# Patient Record
Sex: MALE | Race: WHITE | NOT HISPANIC OR LATINO | Employment: FULL TIME | URBAN - METROPOLITAN AREA
[De-identification: names, ages, dates, MRNs, and addresses within clinical notes are randomized per-mention and may not be internally consistent; named-entity substitution may affect disease eponyms.]

---

## 2022-12-09 ENCOUNTER — HOSPITAL ENCOUNTER (OUTPATIENT)
Dept: RADIOLOGY | Facility: HOSPITAL | Age: 37
Discharge: HOME | End: 2022-12-09
Attending: FAMILY MEDICINE
Payer: COMMERCIAL

## 2022-12-09 ENCOUNTER — OFFICE VISIT (OUTPATIENT)
Dept: PRIMARY CARE CLINIC | Facility: CLINIC | Age: 37
End: 2022-12-09
Payer: COMMERCIAL

## 2022-12-09 VITALS
WEIGHT: 211.63 LBS | RESPIRATION RATE: 16 BRPM | BODY MASS INDEX: 28.66 KG/M2 | HEART RATE: 96 BPM | SYSTOLIC BLOOD PRESSURE: 118 MMHG | HEIGHT: 72 IN | TEMPERATURE: 98 F | DIASTOLIC BLOOD PRESSURE: 68 MMHG

## 2022-12-09 DIAGNOSIS — R07.89 CHEST DISCOMFORT: ICD-10-CM

## 2022-12-09 DIAGNOSIS — R10.84 GENERALIZED ABDOMINAL PAIN: ICD-10-CM

## 2022-12-09 DIAGNOSIS — Z13.6 ENCOUNTER FOR LIPID SCREENING FOR CARDIOVASCULAR DISEASE: ICD-10-CM

## 2022-12-09 DIAGNOSIS — R53.83 FATIGUE, UNSPECIFIED TYPE: ICD-10-CM

## 2022-12-09 DIAGNOSIS — R06.02 SOB (SHORTNESS OF BREATH) ON EXERTION: ICD-10-CM

## 2022-12-09 DIAGNOSIS — Z13.1 DIABETES MELLITUS SCREENING: ICD-10-CM

## 2022-12-09 DIAGNOSIS — R11.0 NAUSEA: ICD-10-CM

## 2022-12-09 DIAGNOSIS — Z00.00 ANNUAL PHYSICAL EXAM: Primary | ICD-10-CM

## 2022-12-09 DIAGNOSIS — G89.29 CHRONIC BILATERAL LOW BACK PAIN WITH LEFT-SIDED SCIATICA: ICD-10-CM

## 2022-12-09 DIAGNOSIS — Z12.11 COLON CANCER SCREENING: ICD-10-CM

## 2022-12-09 DIAGNOSIS — Z11.59 ENCOUNTER FOR HCV SCREENING TEST FOR LOW RISK PATIENT: ICD-10-CM

## 2022-12-09 DIAGNOSIS — Z13.29 THYROID DISORDER SCREENING: ICD-10-CM

## 2022-12-09 DIAGNOSIS — Z76.89 ENCOUNTER TO ESTABLISH CARE: ICD-10-CM

## 2022-12-09 DIAGNOSIS — M54.42 CHRONIC BILATERAL LOW BACK PAIN WITH LEFT-SIDED SCIATICA: ICD-10-CM

## 2022-12-09 DIAGNOSIS — Z11.4 ENCOUNTER FOR SCREENING FOR HIV: ICD-10-CM

## 2022-12-09 DIAGNOSIS — Z13.220 ENCOUNTER FOR LIPID SCREENING FOR CARDIOVASCULAR DISEASE: ICD-10-CM

## 2022-12-09 PROCEDURE — 71046 X-RAY EXAM CHEST 2 VIEWS: CPT | Mod: TC,PN

## 2022-12-09 PROCEDURE — 99385 PREV VISIT NEW AGE 18-39: CPT | Mod: S$GLB,,, | Performed by: FAMILY MEDICINE

## 2022-12-09 PROCEDURE — 3008F PR BODY MASS INDEX (BMI) DOCUMENTED: ICD-10-PCS | Mod: CPTII,S$GLB,, | Performed by: FAMILY MEDICINE

## 2022-12-09 PROCEDURE — 3078F PR MOST RECENT DIASTOLIC BLOOD PRESSURE < 80 MM HG: ICD-10-PCS | Mod: CPTII,S$GLB,, | Performed by: FAMILY MEDICINE

## 2022-12-09 PROCEDURE — 93010 EKG 12-LEAD: ICD-10-PCS | Mod: S$GLB,,, | Performed by: INTERNAL MEDICINE

## 2022-12-09 PROCEDURE — 3008F BODY MASS INDEX DOCD: CPT | Mod: CPTII,S$GLB,, | Performed by: FAMILY MEDICINE

## 2022-12-09 PROCEDURE — 99999 PR PBB SHADOW E&M-EST. PATIENT-LVL V: CPT | Mod: PBBFAC,,, | Performed by: FAMILY MEDICINE

## 2022-12-09 PROCEDURE — 99385 PR PREVENTIVE VISIT,NEW,18-39: ICD-10-PCS | Mod: S$GLB,,, | Performed by: FAMILY MEDICINE

## 2022-12-09 PROCEDURE — 93005 ELECTROCARDIOGRAM TRACING: CPT | Mod: S$GLB,,, | Performed by: FAMILY MEDICINE

## 2022-12-09 PROCEDURE — 1159F PR MEDICATION LIST DOCUMENTED IN MEDICAL RECORD: ICD-10-PCS | Mod: CPTII,S$GLB,, | Performed by: FAMILY MEDICINE

## 2022-12-09 PROCEDURE — 1159F MED LIST DOCD IN RCRD: CPT | Mod: CPTII,S$GLB,, | Performed by: FAMILY MEDICINE

## 2022-12-09 PROCEDURE — 93010 ELECTROCARDIOGRAM REPORT: CPT | Mod: S$GLB,,, | Performed by: INTERNAL MEDICINE

## 2022-12-09 PROCEDURE — 1160F PR REVIEW ALL MEDS BY PRESCRIBER/CLIN PHARMACIST DOCUMENTED: ICD-10-PCS | Mod: CPTII,S$GLB,, | Performed by: FAMILY MEDICINE

## 2022-12-09 PROCEDURE — 3074F SYST BP LT 130 MM HG: CPT | Mod: CPTII,S$GLB,, | Performed by: FAMILY MEDICINE

## 2022-12-09 PROCEDURE — 93005 EKG 12-LEAD: ICD-10-PCS | Mod: S$GLB,,, | Performed by: FAMILY MEDICINE

## 2022-12-09 PROCEDURE — 3074F PR MOST RECENT SYSTOLIC BLOOD PRESSURE < 130 MM HG: ICD-10-PCS | Mod: CPTII,S$GLB,, | Performed by: FAMILY MEDICINE

## 2022-12-09 PROCEDURE — 99999 PR PBB SHADOW E&M-EST. PATIENT-LVL V: ICD-10-PCS | Mod: PBBFAC,,, | Performed by: FAMILY MEDICINE

## 2022-12-09 PROCEDURE — 3078F DIAST BP <80 MM HG: CPT | Mod: CPTII,S$GLB,, | Performed by: FAMILY MEDICINE

## 2022-12-09 PROCEDURE — 1160F RVW MEDS BY RX/DR IN RCRD: CPT | Mod: CPTII,S$GLB,, | Performed by: FAMILY MEDICINE

## 2022-12-09 RX ORDER — ONDANSETRON 4 MG/1
4 TABLET, ORALLY DISINTEGRATING ORAL EVERY 6 HOURS PRN
Qty: 20 TABLET | Refills: 3 | Status: ON HOLD | OUTPATIENT
Start: 2022-12-09 | End: 2023-01-18

## 2022-12-09 RX ORDER — PROMETHAZINE HYDROCHLORIDE 25 MG/1
25 SUPPOSITORY RECTAL EVERY 6 HOURS PRN
Qty: 12 SUPPOSITORY | Refills: 3 | Status: ON HOLD | OUTPATIENT
Start: 2022-12-09 | End: 2023-01-18

## 2022-12-09 RX ORDER — PANTOPRAZOLE SODIUM 20 MG/1
20 TABLET, DELAYED RELEASE ORAL DAILY
Qty: 90 TABLET | Refills: 3 | Status: SHIPPED | OUTPATIENT
Start: 2022-12-09 | End: 2023-12-09

## 2022-12-09 NOTE — PATIENT INSTRUCTIONS
Physically, everything looks good today.      Let us get some screening blood work today to check things on the inside.  Those results will be posted to [facility] as soon as they are available.      Let us also get an EKG and chest x-ray to check out your heart, lungs, etc..  We will contact you with those results when they are available.      Instead of taking Zantac, let us switch you over to Protonix (pantoprazole).  Take this once daily, each morning.  This should help settle your stomach and minimize the nausea.    Prescriptions sent for both Zofran and Phenergan suppositories to use, as needed, if the nausea returns.      With these symptoms, and your family history, let us also have you see Gastroenterology.  Would like them to check you out, and discuss doing an EGD and colonoscopy to check your GI tract on the inside.  Referral placed today.  We may need to go to [place] for this to get this done sooner.      Otherwise, continue to eat a healthy diet.  Be careful with portion sizes.  Includes lots of fresh fruits, vegetables, whole grains, lean proteins.  See info below.    Keep hydrated.  Be sure to drink at least 8-10, 8 oz, glasses of water every day.    Stay active.  Try to do some sort of physical activity every day.  Nothing outrageous, just try walking for 10-15 minutes each day.

## 2022-12-09 NOTE — PROGRESS NOTES
[facility]       [address] [place] [address]      Phone: [phone]      Fax: [phone]    Alfie Castillo MD    Office Visit  12/09/2022    Establish Care      37-year-old gentleman presents today to [facility], annual wellness exam, discuss multiple issues.      Patient states that he has been having some abdominal issues lately.  Several months ago, he got sick.  Developed fever, chills, sweats.  Had nausea, vomiting.  Symptoms lasted about seven days, then resolved on its own.  Saw urgent Care in another state, was given Zofran, another stomach medicine.  Same set of symptoms happened again on another occasion.      Recently, the week of Thanksgiving, he had a similar episode, but symptoms only lasted one day.  This past Friday, one week ago, he had symptoms again, also lasting only one day.  Today, he reports some mild nausea, extreme fatigue, had a headache this morning.  States that he has not had a good, regular, bowel movement in many months.  Bowel movements are always very soft or liquid.  Does have multiple bowel movements daily.  No urinary issues.    States that certain foods will cause him to have heartburn, especially if he eats right before bedtime.  Takes Zantac, as needed, for this.    Also reports some chest discomfort, upper back pains.  Feels like it is hard to take a deep breath.  Hurts to breathe.  These symptoms get worse with activity.    Also reports ongoing lower back pains.  Bilateral.  Radiates to left leg.  Skin of left leg, just above the knee, feels numb, tingly, almost burning sensation.    States that his mom was recently diagnosed with lung cancer.  This is a new diagnosis.  She has multiple nodules in the lung, brain, lymph nodes.  Poor prognosis.  Thinks some of this may have him feeling down, fatigued, drained.    PMH: None reported   PSH: Right ring finger  FH: Lung cancer.  Colon cancer (maternal uncle  passed from at age 36).  CVA.  CAD/mi/CABG.  DM.  DDD.    Allergies:  NKDA   Social: Works in [employer]    T: Quit three weeks ago.  Previously, one ppd times 15 years  A:  Denies   D:  Smokes THC most evenings     Exercise:  No regular exercise program, but very active at work      Immunizations:    There is no immunization history on file for this patient.    Care Teams:  Patient Care Team:  Alfie Castillo MD as PCP - General (Family Medicine)    Medical History:  History reviewed. No pertinent past medical history.    Social History:  Social History     Socioeconomic History    Marital status: Single   Tobacco Use    Smoking status: Former     Types: Vaping with nicotine, Cigarettes     Quit date: 2022     Years since quittin.0    Smokeless tobacco: Never    Tobacco comments:     Vaping now, quit cigarettes   Substance and Sexual Activity    Alcohol use: Not Currently    Drug use: Yes     Types: Marijuana    Sexual activity: Yes     Partners: Male       Alcohol History:  Social History     Substance and Sexual Activity   Alcohol Use Not Currently       Tobacco History:  Social History     Tobacco Use   Smoking Status Former    Types: Vaping with nicotine, Cigarettes    Quit date: 2022    Years since quittin.0   Smokeless Tobacco Never   Tobacco Comments    Vaping now, quit cigarettes       Family History:  Family History   Problem Relation Age of Onset    Lung cancer Mother     Heart disease Father     Diabetes Father     Heart disease Maternal Grandmother     Stroke Maternal Grandmother     Stroke Paternal Grandmother     Heart disease Paternal Grandmother     Diabetes Paternal Grandfather        Surgical History:  Past Surgical History:   Procedure Laterality Date    FINGER SURGERY Right 2022       Allergies:  Review of patient's allergies indicates:  No Known Allergies    Medications:    Current Outpatient Medications:     ranitidine HCl (ZANTAC ORAL), Take by mouth., Disp: ,  "Rfl:     ondansetron (ZOFRAN-ODT) 4 MG TbDL, Take 1 tablet (4 mg total) by mouth every 6 (six) hours as needed (nausea)., Disp: 20 tablet, Rfl: 3    pantoprazole (PROTONIX) 20 MG tablet, Take 1 tablet (20 mg total) by mouth once daily., Disp: 90 tablet, Rfl: 3    promethazine (PHENERGAN) 25 MG suppository, Place 1 suppository (25 mg total) rectally every 6 (six) hours as needed for Nausea., Disp: 12 suppository, Rfl: 3    Health Maintenance:  Health Maintenance   Topic Date Due    Hepatitis C Screening  Never done    Lipid Panel  Never done    TETANUS VACCINE  Never done       Screening Questions  1.  Do you smoke?  Just recently quit  2.  In the past month have you been bothered by feeling "down", depressed or hopeless? No  3.  In the past month, have you experienced a loss of interest or pleasure in doing things? No  4.  In the past 3 months, on any single occasion have you had 5 or more drinks containing alcohol? No  5.  Regarding your use of alcohol, have you ever felt you should cut down on your drinking? No  6.  Are you sexually active? Yes  7   Do you exercise?   Active at work    Counseling  The patient was counseled regarding diet and exercise, motor vehicle safety, sun exposure, and use of vitamins and supplements.  The patient was counseled regarding Living Will/Durable Power-Of-.  The patient was given information regarding the dangers of smoking and the overuse of alcohol.      Review of Systems   Constitutional:  Positive for chills. Negative for activity change, appetite change and fever.   HENT:  Negative for congestion, postnasal drip, rhinorrhea, sore throat and trouble swallowing.    Respiratory:  Positive for shortness of breath. Negative for cough.    Cardiovascular:  Positive for chest pain. Negative for palpitations.   Gastrointestinal:  Positive for abdominal pain and nausea. Negative for constipation, diarrhea and vomiting.   Genitourinary:  Negative for difficulty urinating. "   Musculoskeletal:  Negative for arthralgias and myalgias.   Skin:  Negative for color change and rash.   Neurological:  Negative for headaches.   All other systems reviewed and are negative.     Objective   Vitals:    12/09/22 0750   BP: 118/68   Pulse: 96   Resp: 16   Temp: 98.2 °F (36.8 °C)   Weight: 96 kg (211 lb 10.3 oz)   Height: 6' (1.829 m)     Physical Exam  Vitals and nursing note reviewed.   Constitutional:       General: He is not in acute distress.     Appearance: Normal appearance.   HENT:      Head: Normocephalic and atraumatic.      Right Ear: Tympanic membrane, ear canal and external ear normal.      Left Ear: Tympanic membrane, ear canal and external ear normal.      Nose: Nose normal. No congestion or rhinorrhea.      Mouth/Throat:      Mouth: Mucous membranes are moist.      Pharynx: Oropharynx is clear. No oropharyngeal exudate or posterior oropharyngeal erythema.   Eyes:      Extraocular Movements: Extraocular movements intact.      Conjunctiva/sclera: Conjunctivae normal.      Pupils: Pupils are equal, round, and reactive to light.   Cardiovascular:      Rate and Rhythm: Normal rate and regular rhythm.   Pulmonary:      Effort: Pulmonary effort is normal.      Breath sounds: No wheezing, rhonchi or rales.   Musculoskeletal:         General: Normal range of motion.      Cervical back: Normal range of motion.   Lymphadenopathy:      Cervical: No cervical adenopathy.   Skin:     General: Skin is warm and dry.   Neurological:      General: No focal deficit present.      Mental Status: He is alert.        No results found for this or any previous visit (from the past 4368 hour(s)).    Plan    José Manuel was seen today for establish care.    Diagnoses and all orders for this visit:    Annual physical exam  -     CBC Auto Differential; Future  -     Comprehensive Metabolic Panel; Future  -     TSH; Future  -     Lipid Panel; Future  -     Hemoglobin A1C; Future  -     Hepatitis C Antibody; Future  -      HIV 1/2 Ag/Ab (4th Gen); Future  -     AMYLASE; Future  -     LIPASE; Future    Encounter to establish care  -     CBC Auto Differential; Future  -     Comprehensive Metabolic Panel; Future  -     TSH; Future  -     Lipid Panel; Future  -     Hemoglobin A1C; Future  -     Hepatitis C Antibody; Future  -     HIV 1/2 Ag/Ab (4th Gen); Future  -     AMYLASE; Future  -     LIPASE; Future    Nausea  -     pantoprazole (PROTONIX) 20 MG tablet; Take 1 tablet (20 mg total) by mouth once daily.  -     ondansetron (ZOFRAN-ODT) 4 MG TbDL; Take 1 tablet (4 mg total) by mouth every 6 (six) hours as needed (nausea).  -     promethazine (PHENERGAN) 25 MG suppository; Place 1 suppository (25 mg total) rectally every 6 (six) hours as needed for Nausea.  -     Ambulatory referral/consult to Gastroenterology; Future    Generalized abdominal pain  -     CBC Auto Differential; Future  -     Comprehensive Metabolic Panel; Future  -     AMYLASE; Future  -     LIPASE; Future  -     pantoprazole (PROTONIX) 20 MG tablet; Take 1 tablet (20 mg total) by mouth once daily.  -     Ambulatory referral/consult to Gastroenterology; Future    Fatigue, unspecified type  -     CBC Auto Differential; Future  -     Comprehensive Metabolic Panel; Future  -     TSH; Future  -     Lipid Panel; Future  -     Hemoglobin A1C; Future  -     Hepatitis C Antibody; Future  -     HIV 1/2 Ag/Ab (4th Gen); Future  -     AMYLASE; Future  -     LIPASE; Future    Chest discomfort  -     IN OFFICE EKG 12-LEAD (to Muse)  -     X-Ray Chest PA And Lateral; Future  -     CBC Auto Differential; Future  -     Comprehensive Metabolic Panel; Future  -     TSH; Future  -     Lipid Panel; Future    SOB (shortness of breath) on exertion  -     IN OFFICE EKG 12-LEAD (to Muse)  -     X-Ray Chest PA And Lateral; Future  -     CBC Auto Differential; Future  -     Comprehensive Metabolic Panel; Future    Chronic bilateral low back pain with left-sided sciatica  -     Ambulatory  referral/consult to Pain Clinic; Future    Encounter for lipid screening for cardiovascular disease  -     Lipid Panel; Future    Thyroid disorder screening  -     TSH; Future    Colon cancer screening  -     Ambulatory referral/consult to Gastroenterology; Future    Encounter for HCV screening test for low risk patient  -     Hepatitis C Antibody; Future    Encounter for screening for HIV  -     HIV 1/2 Ag/Ab (4th Gen); Future    Diabetes mellitus screening  -     Hemoglobin A1C; Future    Physically, looks pretty good today.      Screening labs, as above.      EKG done in clinic for chest discomfort shows sinus rhythm.  Will get chest x-ray, as well, considering his smoking history, family history.      With his GI symptoms, family history of colon cancer (maternal uncle passed at age 36), will place referral to GI today for evaluation, colonoscopy, possible EGD, as well.    In the meantime, we will use Zofran/Phenergan suppositories for nausea.  Protonix for GERD, reflux.    He is interested in blood test to screen for multiple cancers, especially given his family history.  We will have to get information and get back to him on this.    For ongoing lower and upper back pains, referral placed today for Dr. Schultz, spine specialist.    Follow-up:  Follow up if symptoms worsen or fail to improve, for and in 1 year for annual exam.    Signed by:  Alfie Castillo MD

## 2022-12-09 NOTE — PROGRESS NOTES
Mr. Georges,     Chest x-ray looks good.  Heart size is normal.  Lungs are clear, no masses, tumors, etc..    EKG also looked good.  Normal sinus rhythm.  No heart issues.    We will contact you with the rest of the blood work as soon as it is available.    Wanted to let you know that I also placed a referral to have you see Dr. Timmons, our back specialist.  He comes here every Tuesday.  Want him to take a look at the upper and lower spine to see if he can get you some relief, especially with the symptoms in the leg.

## 2022-12-12 ENCOUNTER — TELEPHONE (OUTPATIENT)
Dept: PAIN MEDICINE | Facility: CLINIC | Age: 37
End: 2022-12-12
Payer: COMMERCIAL

## 2022-12-13 ENCOUNTER — OFFICE VISIT (OUTPATIENT)
Dept: PAIN MEDICINE | Facility: CLINIC | Age: 37
End: 2022-12-13
Payer: COMMERCIAL

## 2022-12-13 ENCOUNTER — HOSPITAL ENCOUNTER (OUTPATIENT)
Dept: RADIOLOGY | Facility: HOSPITAL | Age: 37
Discharge: HOME | End: 2022-12-13
Attending: ANESTHESIOLOGY
Payer: COMMERCIAL

## 2022-12-13 VITALS
WEIGHT: 213.38 LBS | BODY MASS INDEX: 28.9 KG/M2 | HEART RATE: 88 BPM | HEIGHT: 72 IN | RESPIRATION RATE: 17 BRPM | DIASTOLIC BLOOD PRESSURE: 92 MMHG | SYSTOLIC BLOOD PRESSURE: 133 MMHG

## 2022-12-13 DIAGNOSIS — G89.29 CHRONIC BILATERAL LOW BACK PAIN WITH LEFT-SIDED SCIATICA: ICD-10-CM

## 2022-12-13 DIAGNOSIS — M54.16 LUMBAR RADICULOPATHY: ICD-10-CM

## 2022-12-13 DIAGNOSIS — M47.816 LUMBAR SPONDYLOSIS: ICD-10-CM

## 2022-12-13 DIAGNOSIS — M47.814 THORACIC SPONDYLOSIS: ICD-10-CM

## 2022-12-13 DIAGNOSIS — R07.89 COSTOCHONDRAL CHEST PAIN: Primary | ICD-10-CM

## 2022-12-13 DIAGNOSIS — M54.14 THORACIC RADICULOPATHY: ICD-10-CM

## 2022-12-13 DIAGNOSIS — M54.42 CHRONIC BILATERAL LOW BACK PAIN WITH LEFT-SIDED SCIATICA: ICD-10-CM

## 2022-12-13 PROCEDURE — 3044F PR MOST RECENT HEMOGLOBIN A1C LEVEL <7.0%: ICD-10-PCS | Mod: CPTII,S$GLB,, | Performed by: ANESTHESIOLOGY

## 2022-12-13 PROCEDURE — 99999 PR PBB SHADOW E&M-EST. PATIENT-LVL IV: ICD-10-PCS | Mod: PBBFAC,,, | Performed by: ANESTHESIOLOGY

## 2022-12-13 PROCEDURE — 3044F HG A1C LEVEL LT 7.0%: CPT | Mod: CPTII,S$GLB,, | Performed by: ANESTHESIOLOGY

## 2022-12-13 PROCEDURE — 72100 X-RAY EXAM L-S SPINE 2/3 VWS: CPT | Mod: TC,PN

## 2022-12-13 PROCEDURE — 3075F PR MOST RECENT SYSTOLIC BLOOD PRESS GE 130-139MM HG: ICD-10-PCS | Mod: CPTII,S$GLB,, | Performed by: ANESTHESIOLOGY

## 2022-12-13 PROCEDURE — 99203 PR OFFICE/OUTPT VISIT, NEW, LEVL III, 30-44 MIN: ICD-10-PCS | Mod: S$GLB,,, | Performed by: ANESTHESIOLOGY

## 2022-12-13 PROCEDURE — 72070 X-RAY EXAM THORAC SPINE 2VWS: CPT | Mod: TC,PN

## 2022-12-13 PROCEDURE — 3008F PR BODY MASS INDEX (BMI) DOCUMENTED: ICD-10-PCS | Mod: CPTII,S$GLB,, | Performed by: ANESTHESIOLOGY

## 2022-12-13 PROCEDURE — 3080F PR MOST RECENT DIASTOLIC BLOOD PRESSURE >= 90 MM HG: ICD-10-PCS | Mod: CPTII,S$GLB,, | Performed by: ANESTHESIOLOGY

## 2022-12-13 PROCEDURE — 1159F PR MEDICATION LIST DOCUMENTED IN MEDICAL RECORD: ICD-10-PCS | Mod: CPTII,S$GLB,, | Performed by: ANESTHESIOLOGY

## 2022-12-13 PROCEDURE — 3008F BODY MASS INDEX DOCD: CPT | Mod: CPTII,S$GLB,, | Performed by: ANESTHESIOLOGY

## 2022-12-13 PROCEDURE — 99999 PR PBB SHADOW E&M-EST. PATIENT-LVL IV: CPT | Mod: PBBFAC,,, | Performed by: ANESTHESIOLOGY

## 2022-12-13 PROCEDURE — 3080F DIAST BP >= 90 MM HG: CPT | Mod: CPTII,S$GLB,, | Performed by: ANESTHESIOLOGY

## 2022-12-13 PROCEDURE — 99203 OFFICE O/P NEW LOW 30 MIN: CPT | Mod: S$GLB,,, | Performed by: ANESTHESIOLOGY

## 2022-12-13 PROCEDURE — 3075F SYST BP GE 130 - 139MM HG: CPT | Mod: CPTII,S$GLB,, | Performed by: ANESTHESIOLOGY

## 2022-12-13 PROCEDURE — 1159F MED LIST DOCD IN RCRD: CPT | Mod: CPTII,S$GLB,, | Performed by: ANESTHESIOLOGY

## 2022-12-13 RX ORDER — CELECOXIB 200 MG/1
200 CAPSULE ORAL 2 TIMES DAILY PRN
Qty: 60 CAPSULE | Refills: 1 | Status: ON HOLD | OUTPATIENT
Start: 2022-12-13 | End: 2023-01-18

## 2022-12-13 RX ORDER — LIDOCAINE 50 MG/G
PATCH TOPICAL
Qty: 30 PATCH | Refills: 1 | Status: SHIPPED | OUTPATIENT
Start: 2022-12-13

## 2022-12-13 NOTE — PROGRESS NOTES
New Patient Interventional Pain Note (Initial Visit)    Referring Physician: Alfie Castillo MD    PCP: Alfie Castillo MD    Chief Complaint:   Thoracic and Lumbar Pain       SUBJECTIVE:    José Manuel Darling is a 37 y.o. male who presents to the clinic for the evaluation of thoracic and lumbar pain.   Patient reports over 10 year history of thoracic and low back pain with recent worsening thoracic pain over the past 6 months.  Thoracic pain described as a aching tightness pain in the interscapular area that also radiates to his sternum.  Pain is constant in patient can not define any significant alleviating or aggravating factors.  Lower back pain described as a aching burning pain that starts in the left lower back then radiates down his left lower extremity and numbness and tingling pain to his toes.  Pain is worse with prolonged driving, better with heat and rest.  Pain is rated a 7/10. Denies any fevers, chills, changes in gait, weakness, or bowel and bladder incontinence      Non-Pharmacologic Treatments:  Physical Therapy/Home Exercise: yes  Ice/Heat:yes  TENS: no  Acupuncture: no  Massage: no  Chiropractic: no        Previous Pain Medications:  NSAIDs, Tylenol, muscle relaxers     report:  Reviewed and consistent with medication use as prescribed.    Pain Procedures:   None      Imaging:   No pertinent imaging on file    History reviewed. No pertinent past medical history.  Past Surgical History:   Procedure Laterality Date    FINGER SURGERY Right 2022     Social History     Socioeconomic History    Marital status: Single   Tobacco Use    Smoking status: Former     Types: Vaping with nicotine, Cigarettes     Quit date: 2022     Years since quittin.0    Smokeless tobacco: Never    Tobacco comments:     Vaping now, quit cigarettes   Substance and Sexual Activity    Alcohol use: Not Currently    Drug use: Yes     Types: Marijuana    Sexual activity: Yes     Partners: Male     Family  History   Problem Relation Age of Onset    Lung cancer Mother     Heart disease Father     Diabetes Father     Heart disease Maternal Grandmother     Stroke Maternal Grandmother     Stroke Paternal Grandmother     Heart disease Paternal Grandmother     Diabetes Paternal Grandfather        Review of patient's allergies indicates:  No Known Allergies    Current Outpatient Medications   Medication Sig    ondansetron (ZOFRAN-ODT) 4 MG TbDL Take 1 tablet (4 mg total) by mouth every 6 (six) hours as needed (nausea).    pantoprazole (PROTONIX) 20 MG tablet Take 1 tablet (20 mg total) by mouth once daily.    promethazine (PHENERGAN) 25 MG suppository Place 1 suppository (25 mg total) rectally every 6 (six) hours as needed for Nausea.    celecoxib (CELEBREX) 200 MG capsule Take 1 capsule (200 mg total) by mouth 2 (two) times daily as needed for Pain.    LIDOcaine (LIDODERM) 5 % Use 1 patch per day over chest or thoracic spine.  Do not keep patch on affected area for more than 12 hours.  Please wait 12 hours before applying new patch    ranitidine HCl (ZANTAC ORAL) Take by mouth.     No current facility-administered medications for this visit.         ROS  Review of Systems   Constitutional:  Negative for activity change, appetite change and fever.   HENT:  Negative for facial swelling, rhinorrhea and sore throat.    Eyes:  Negative for pain and redness.   Respiratory:  Negative for cough, chest tightness, shortness of breath, wheezing and stridor.    Cardiovascular:  Negative for chest pain, palpitations and leg swelling.   Gastrointestinal:  Negative for abdominal pain, blood in stool, constipation, diarrhea, nausea and vomiting.   Endocrine: Negative for polydipsia, polyphagia and polyuria.   Genitourinary:  Negative for dysuria, hematuria and urgency.   Musculoskeletal:  Positive for arthralgias, back pain, gait problem and myalgias. Negative for joint swelling, neck pain and neck stiffness.        Anterior chest wall  pain   Skin:  Negative for rash.   Allergic/Immunologic: Negative for food allergies.   Neurological:  Positive for weakness and numbness. Negative for dizziness, tremors, seizures, syncope, facial asymmetry, speech difficulty, light-headedness and headaches.   Psychiatric/Behavioral:  Negative for agitation, hallucinations, self-injury and suicidal ideas. The patient is not nervous/anxious and is not hyperactive.           OBJECTIVE:  BP (!) 133/92   Pulse 88   Resp 17   Ht 6' (1.829 m)   Wt 96.8 kg (213 lb 6.5 oz)   BMI 28.94 kg/m²         Physical Exam  Constitutional:       General: He is not in acute distress.     Appearance: Normal appearance. He is not ill-appearing.   HENT:      Head: Normocephalic and atraumatic.      Nose: No congestion or rhinorrhea.      Mouth/Throat:      Mouth: Mucous membranes are moist.   Eyes:      Extraocular Movements: Extraocular movements intact.      Pupils: Pupils are equal, round, and reactive to light.   Cardiovascular:      Pulses: Normal pulses.   Pulmonary:      Effort: Pulmonary effort is normal.      Comments: Tenderness to palpation over sternum  Abdominal:      General: Abdomen is flat.      Palpations: Abdomen is soft.   Musculoskeletal:      Cervical back: Normal range of motion and neck supple.   Skin:     General: Skin is warm and dry.      Capillary Refill: Capillary refill takes less than 2 seconds.   Neurological:      General: No focal deficit present.      Mental Status: He is alert and oriented to person, place, and time.      Cranial Nerves: No cranial nerve deficit.      Sensory: No sensory deficit.      Motor: Weakness present. No abnormal muscle tone.      Gait: Gait normal.      Deep Tendon Reflexes:      Reflex Scores:       Patellar reflexes are 2+ on the right side and 2+ on the left side.       Achilles reflexes are 2+ on the right side and 2+ on the left side.     Comments: 4/5 strength in left plantar flexion and dorsiflexion. Otherwise, 5/5  strength in muscle groups of bilateral lower extremities     Psychiatric:         Mood and Affect: Mood normal.         Behavior: Behavior normal.         Thought Content: Thought content normal.         Musculoskeletal:    Thoracic Exam  Incision: no  Pain with Flexion: yes  Pain with Extension: yes  Paraspinous TTP:  T6-T7  Facet TTP:  T6-T7  ROM:  Decreased    Lumbar Exam  Incision: no  Pain with Flexion: yes  Pain with Extension: no  ROM:  Decreased  Paraspinous TTP:  Left lumbar paraspinous  Facet TTP:  L5-S1  Facet Loading:  Positive on the left  SLR:  Positive on left at 70°  SIJ TTP:  Negative bilaterally  ANA:  Negative bilaterally      LABS:  Lab Results   Component Value Date    WBC 4.33 12/09/2022    HGB 14.0 12/09/2022    HCT 42.6 12/09/2022    MCV 97 12/09/2022     12/09/2022       CMP  Sodium   Date Value Ref Range Status   12/09/2022 137 136 - 145 mmol/L Final     Potassium   Date Value Ref Range Status   12/09/2022 4.7 3.5 - 5.1 mmol/L Final     Chloride   Date Value Ref Range Status   12/09/2022 103 95 - 110 mmol/L Final     CO2   Date Value Ref Range Status   12/09/2022 25 23 - 29 mmol/L Final     Glucose   Date Value Ref Range Status   12/09/2022 88 70 - 110 mg/dL Final     BUN   Date Value Ref Range Status   12/09/2022 11 6 - 20 mg/dL Final     Creatinine   Date Value Ref Range Status   12/09/2022 0.9 0.5 - 1.4 mg/dL Final     Calcium   Date Value Ref Range Status   12/09/2022 9.4 8.7 - 10.5 mg/dL Final     Total Protein   Date Value Ref Range Status   12/09/2022 6.7 6.0 - 8.4 g/dL Final     Albumin   Date Value Ref Range Status   12/09/2022 4.1 3.5 - 5.2 g/dL Final     Total Bilirubin   Date Value Ref Range Status   12/09/2022 0.4 0.1 - 1.0 mg/dL Final     Comment:     For infants and newborns, interpretation of results should be based  on gestational age, weight and in agreement with clinical  observations.    Premature Infant recommended reference ranges:  Up to 24  hours.............<8.0 mg/dL  Up to 48 hours............<12.0 mg/dL  3-5 days..................<15.0 mg/dL  6-29 days.................<15.0 mg/dL       Alkaline Phosphatase   Date Value Ref Range Status   12/09/2022 63 55 - 135 U/L Final     AST   Date Value Ref Range Status   12/09/2022 15 10 - 40 U/L Final     ALT   Date Value Ref Range Status   12/09/2022 19 10 - 44 U/L Final     Anion Gap   Date Value Ref Range Status   12/09/2022 9 8 - 16 mmol/L Final       Lab Results   Component Value Date    HGBA1C 5.3 12/09/2022             ASSESSMENT:       37 y.o. year old male with thoracic and lower back pain, consistent with     1. Costochondral chest pain  celecoxib (CELEBREX) 200 MG capsule    LIDOcaine (LIDODERM) 5 %      2. Chronic bilateral low back pain with left-sided sciatica  Ambulatory referral/consult to Pain Clinic      3. Thoracic spondylosis  celecoxib (CELEBREX) 200 MG capsule    LIDOcaine (LIDODERM) 5 %      4. Thoracic radiculopathy  X-Ray Thoracic Spine AP Lateral    Ambulatory referral/consult to Physical/Occupational Therapy      5. Lumbar spondylosis  celecoxib (CELEBREX) 200 MG capsule      6. Lumbar radiculopathy  X-Ray Lumbar Spine 2 Or 3 Views    Ambulatory referral/consult to Physical/Occupational Therapy        Costochondral chest pain  -     celecoxib (CELEBREX) 200 MG capsule; Take 1 capsule (200 mg total) by mouth 2 (two) times daily as needed for Pain.  Dispense: 60 capsule; Refill: 1  -     LIDOcaine (LIDODERM) 5 %; Use 1 patch per day over chest or thoracic spine.  Do not keep patch on affected area for more than 12 hours.  Please wait 12 hours before applying new patch  Dispense: 30 patch; Refill: 1    Chronic bilateral low back pain with left-sided sciatica  -     Ambulatory referral/consult to Pain Clinic    Thoracic spondylosis  -     celecoxib (CELEBREX) 200 MG capsule; Take 1 capsule (200 mg total) by mouth 2 (two) times daily as needed for Pain.  Dispense: 60 capsule; Refill:  1  -     LIDOcaine (LIDODERM) 5 %; Use 1 patch per day over chest or thoracic spine.  Do not keep patch on affected area for more than 12 hours.  Please wait 12 hours before applying new patch  Dispense: 30 patch; Refill: 1    Thoracic radiculopathy  -     X-Ray Thoracic Spine AP Lateral; Future; Expected date: 12/13/2022  -     Ambulatory referral/consult to Physical/Occupational Therapy; Future; Expected date: 12/20/2022    Lumbar spondylosis  -     celecoxib (CELEBREX) 200 MG capsule; Take 1 capsule (200 mg total) by mouth 2 (two) times daily as needed for Pain.  Dispense: 60 capsule; Refill: 1    Lumbar radiculopathy  -     X-Ray Lumbar Spine 2 Or 3 Views; Future; Expected date: 12/13/2022  -     Ambulatory referral/consult to Physical/Occupational Therapy; Future; Expected date: 12/20/2022             PLAN:   - Interventions:   None at this time.  Can consider costochondral injection versus thoracic medial branch blocks if thoracic and sternal pain continue.  If lower back pain continues we will order MRI and see if patient is candidate for lumbar epidural steroid injection    - Anticoagulation use:   no no anticoagulation    - Medications:   Start Celebrex 200 mg twice a day as needed   Start Lidoderm patch 1 patch daily for sternum and thoracic pain    - Therapy:    Refer to physical therapy for thoracic and lower back pain    - Imaging/Diagnostic:   Will obtain x-rays of thoracic and lumbar spine today to evaluate chronic thoracic and lumbar pain.    - Consults:   None at this time      - Patient Questions: Answered all of the patient's questions regarding diagnosis, therapy, and treatment    - Follow up visit: return to clinic 5-6 weeks        The above plan and management options were discussed at length with patient. Patient is in agreement with the above and verbalized understanding.    I discussed the goals of interventional chronic pain management with the patient on today's visit.  I explained the  utility of injections for diagnostic and therapeutic purposes.  We discussed a multimodal approach to pain including treating the patient's given worst pain at any given time.  We will use a systematic approach to addressing pain.  We will also adopt a multimodal approach that includes injections, adjuvant medications, physical therapy, at times psychiatry.  There may be a limited role for opioid use intermittently in the treatment of pain, more particularly for acute pain although no one approach can be used as a sole treatment modality.    I emphasized the importance of regular exercise, core strengthening and stretching, diet and weight loss as a cornerstone of long-term pain management.      Gonsalo Timmons MD  Interventional Pain Management  [facility]    Disclaimer:  This note was prepared using voice recognition system and is likely to have sound alike errors that may have been overlooked even after proof reading.  Please call me with any questions

## 2022-12-15 ENCOUNTER — OFFICE VISIT (OUTPATIENT)
Dept: GASTROENTEROLOGY | Facility: CLINIC | Age: 37
End: 2022-12-15
Payer: COMMERCIAL

## 2022-12-15 VITALS
HEIGHT: 72 IN | DIASTOLIC BLOOD PRESSURE: 66 MMHG | HEART RATE: 93 BPM | OXYGEN SATURATION: 97 % | BODY MASS INDEX: 28.78 KG/M2 | SYSTOLIC BLOOD PRESSURE: 120 MMHG | WEIGHT: 212.5 LBS

## 2022-12-15 DIAGNOSIS — R19.4 CHANGE IN BOWEL HABITS: Primary | ICD-10-CM

## 2022-12-15 DIAGNOSIS — R11.0 NAUSEA: ICD-10-CM

## 2022-12-15 DIAGNOSIS — Z12.11 COLON CANCER SCREENING: ICD-10-CM

## 2022-12-15 DIAGNOSIS — R10.84 GENERALIZED ABDOMINAL PAIN: ICD-10-CM

## 2022-12-15 PROCEDURE — 99204 OFFICE O/P NEW MOD 45 MIN: CPT | Mod: S$GLB,,, | Performed by: PHYSICIAN ASSISTANT

## 2022-12-15 PROCEDURE — 3078F PR MOST RECENT DIASTOLIC BLOOD PRESSURE < 80 MM HG: ICD-10-PCS | Mod: CPTII,S$GLB,, | Performed by: PHYSICIAN ASSISTANT

## 2022-12-15 PROCEDURE — 99204 PR OFFICE/OUTPT VISIT, NEW, LEVL IV, 45-59 MIN: ICD-10-PCS | Mod: S$GLB,,, | Performed by: PHYSICIAN ASSISTANT

## 2022-12-15 PROCEDURE — 3044F PR MOST RECENT HEMOGLOBIN A1C LEVEL <7.0%: ICD-10-PCS | Mod: CPTII,S$GLB,, | Performed by: PHYSICIAN ASSISTANT

## 2022-12-15 PROCEDURE — 3078F DIAST BP <80 MM HG: CPT | Mod: CPTII,S$GLB,, | Performed by: PHYSICIAN ASSISTANT

## 2022-12-15 PROCEDURE — 3074F SYST BP LT 130 MM HG: CPT | Mod: CPTII,S$GLB,, | Performed by: PHYSICIAN ASSISTANT

## 2022-12-15 PROCEDURE — 99999 PR PBB SHADOW E&M-EST. PATIENT-LVL IV: ICD-10-PCS | Mod: PBBFAC,,, | Performed by: PHYSICIAN ASSISTANT

## 2022-12-15 PROCEDURE — 1159F MED LIST DOCD IN RCRD: CPT | Mod: CPTII,S$GLB,, | Performed by: PHYSICIAN ASSISTANT

## 2022-12-15 PROCEDURE — 3008F PR BODY MASS INDEX (BMI) DOCUMENTED: ICD-10-PCS | Mod: CPTII,S$GLB,, | Performed by: PHYSICIAN ASSISTANT

## 2022-12-15 PROCEDURE — 3044F HG A1C LEVEL LT 7.0%: CPT | Mod: CPTII,S$GLB,, | Performed by: PHYSICIAN ASSISTANT

## 2022-12-15 PROCEDURE — 1159F PR MEDICATION LIST DOCUMENTED IN MEDICAL RECORD: ICD-10-PCS | Mod: CPTII,S$GLB,, | Performed by: PHYSICIAN ASSISTANT

## 2022-12-15 PROCEDURE — 3074F PR MOST RECENT SYSTOLIC BLOOD PRESSURE < 130 MM HG: ICD-10-PCS | Mod: CPTII,S$GLB,, | Performed by: PHYSICIAN ASSISTANT

## 2022-12-15 PROCEDURE — 99999 PR PBB SHADOW E&M-EST. PATIENT-LVL IV: CPT | Mod: PBBFAC,,, | Performed by: PHYSICIAN ASSISTANT

## 2022-12-15 PROCEDURE — 3008F BODY MASS INDEX DOCD: CPT | Mod: CPTII,S$GLB,, | Performed by: PHYSICIAN ASSISTANT

## 2022-12-15 RX ORDER — SODIUM, POTASSIUM,MAG SULFATES 17.5-3.13G
1 SOLUTION, RECONSTITUTED, ORAL ORAL DAILY
Qty: 1 KIT | Refills: 0 | Status: SHIPPED | OUTPATIENT
Start: 2022-12-15 | End: 2022-12-17

## 2022-12-15 NOTE — PROGRESS NOTES
Clinic Consult:  [facility] Consultation Note    Reason for Consult:  The primary encounter diagnosis was Change in bowel habits. Diagnoses of Nausea, Generalized abdominal pain, and Colon cancer screening were also pertinent to this visit.    PCP: Alfie Castillo   [address] / [place] [id number]    CC: Emesis, Nausea, and Diarrhea      HPI:  This is a 37 y.o. male here for evaluation of the above.   Last few years, nauseated chronically. Early spring, got sick - fevers, chills, sweats, stomach painful, vomiting. Lasted 7 days. Has occurred on 2 different occasions. Week of thanksgiving with similar symptoms, but only lasted 1 day.  Weighed about 245, now down to 212.  Mother with lung cancer. Maternal uncle with colon cancer at 36.  Hasn't had a good bowel movement since first episode on spring. Soft stools, light brown in color. No blood in the stool.   Was started on Protonix almost a week ago - has not noticed any changes since then.   Zofran is helpful for the nausea.   Never had scopes.   States all of the episodes he has are random. When this occurs, the pain is located right to the center of the stomach. Feels like a twisting pain.   Denies ever having had imaging such as CT scan.    Review of Systems   Constitutional:  Positive for appetite change, fever (occasional) and unexpected weight change. Negative for activity change, chills, diaphoresis and fatigue.   HENT:  Negative for sore throat, trouble swallowing and voice change.    Respiratory:  Negative for cough and shortness of breath.    Cardiovascular:  Negative for chest pain.   Gastrointestinal:  Positive for abdominal pain, constipation, diarrhea (soft stool), nausea and vomiting (occasional). Negative for abdominal distention, anal bleeding and blood in stool.   Musculoskeletal:  Positive for back pain.   Skin:  Negative for color change and pallor.   Neurological:  Negative for dizziness, weakness and light-headedness.    Psychiatric/Behavioral:  Negative for dysphoric mood. The patient is not nervous/anxious.       Medical History:   No past medical history on file.    Surgical History:   Past Surgical History:   Procedure Laterality Date    FINGER SURGERY Right 2022       Family History:    Family History   Problem Relation Age of Onset    Lung cancer Mother     Heart disease Father     Diabetes Father     Heart disease Maternal Grandmother     Stroke Maternal Grandmother     Stroke Paternal Grandmother     Heart disease Paternal Grandmother     Diabetes Paternal Grandfather        Social History:   Social History     Socioeconomic History    Marital status: Single   Tobacco Use    Smoking status: Former     Types: Vaping with nicotine, Cigarettes     Quit date: 2022     Years since quittin.0    Smokeless tobacco: Never    Tobacco comments:     Vaping now, quit cigarettes   Substance and Sexual Activity    Alcohol use: Not Currently    Drug use: Yes     Types: Marijuana    Sexual activity: Yes     Partners: Male       Allergies:   Review of patient's allergies indicates:  No Known Allergies    Home Medications:   Current Outpatient Medications on File Prior to Visit   Medication Sig Dispense Refill    ondansetron (ZOFRAN-ODT) 4 MG TbDL Take 1 tablet (4 mg total) by mouth every 6 (six) hours as needed (nausea). 20 tablet 3    pantoprazole (PROTONIX) 20 MG tablet Take 1 tablet (20 mg total) by mouth once daily. 90 tablet 3    celecoxib (CELEBREX) 200 MG capsule Take 1 capsule (200 mg total) by mouth 2 (two) times daily as needed for Pain. (Patient not taking: Reported on 12/15/2022) 60 capsule 1    LIDOcaine (LIDODERM) 5 % Use 1 patch per day over chest or thoracic spine.  Do not keep patch on affected area for more than 12 hours.  Please wait 12 hours before applying new patch (Patient not taking: Reported on 12/15/2022) 30 patch 1    promethazine (PHENERGAN) 25 MG suppository Place 1 suppository (25 mg total)  rectally every 6 (six) hours as needed for Nausea. (Patient not taking: Reported on 12/15/2022) 12 suppository 3    [DISCONTINUED] ranitidine HCl (ZANTAC ORAL) Take by mouth.       No current facility-administered medications on file prior to visit.       Physical Exam:  /66   Pulse 93   Ht 6' (1.829 m)   Wt 96.4 kg (212 lb 8.4 oz)   SpO2 97%   BMI 28.82 kg/m²   Body mass index is 28.82 kg/m².  Physical Exam  Constitutional:       General: He is not in acute distress.     Appearance: Normal appearance. He is not ill-appearing, toxic-appearing or diaphoretic.   HENT:      Head: Normocephalic and atraumatic.   Eyes:      General: No scleral icterus.     Extraocular Movements: Extraocular movements intact.   Cardiovascular:      Rate and Rhythm: Normal rate and regular rhythm.   Pulmonary:      Effort: Pulmonary effort is normal. No respiratory distress.      Breath sounds: Normal breath sounds.   Abdominal:      General: Bowel sounds are normal. There is no distension.      Palpations: Abdomen is soft. There is no mass.      Tenderness: There is abdominal tenderness (epigastric). There is no guarding.   Musculoskeletal:         General: Normal range of motion.      Cervical back: Normal range of motion.   Skin:     General: Skin is warm and dry.      Coloration: Skin is not jaundiced or pale.   Neurological:      Mental Status: He is alert and oriented to person, place, and time.         Labs: Pertinent labs reviewed.  Endoscopy: none  CRC Screening: none  Anticoagulation: none    Assessment:  1. Change in bowel habits    2. Nausea    3. Generalized abdominal pain    4. Colon cancer screening         Recommendations:  -Schedule for EGD and colonoscopy for further evaluation of the above symptoms including abdominal pain, changes in bowel habits, chronic nausea. Also having weight loss.   -Antiemetics as needed.  -Suprep  -US for upper abdominal pain. LFTs and lipase normal, however, given his symptoms would  like to evaluate liver, pancreas and gallbladder.  -Recent labs look good.   -Follow up after imaging and scopes.    Change in bowel habits  -     Case Request Endoscopy: EGD (ESOPHAGOGASTRODUODENOSCOPY), COLONOSCOPY  -     Ambulatory referral/consult to Endo Procedure ; Future; Expected date: 12/16/2022  -     sodium,potassium,mag sulfates (SUPREP BOWEL PREP KIT) 17.5-3.13-1.6 gram SolR; Take 177 mLs by mouth once daily. for 2 days  Dispense: 1 kit; Refill: 0    Nausea  -     Ambulatory referral/consult to Gastroenterology  -     Case Request Endoscopy: EGD (ESOPHAGOGASTRODUODENOSCOPY), COLONOSCOPY  -     Ambulatory referral/consult to Endo Procedure ; Future; Expected date: 12/16/2022  -     sodium,potassium,mag sulfates (SUPREP BOWEL PREP KIT) 17.5-3.13-1.6 gram SolR; Take 177 mLs by mouth once daily. for 2 days  Dispense: 1 kit; Refill: 0    Generalized abdominal pain  -     Ambulatory referral/consult to Gastroenterology  -     Case Request Endoscopy: EGD (ESOPHAGOGASTRODUODENOSCOPY), COLONOSCOPY  -     Ambulatory referral/consult to Endo Procedure ; Future; Expected date: 12/16/2022  -     sodium,potassium,mag sulfates (SUPREP BOWEL PREP KIT) 17.5-3.13-1.6 gram SolR; Take 177 mLs by mouth once daily. for 2 days  Dispense: 1 kit; Refill: 0    Colon cancer screening  -     Ambulatory referral/consult to Gastroenterology        No follow-ups on file.    Thank you so much for allowing me to participate in the care of José Manuel Gupta PA-C

## 2022-12-16 ENCOUNTER — HOSPITAL ENCOUNTER (OUTPATIENT)
Dept: PREADMISSION TESTING | Facility: HOSPITAL | Age: 37
Discharge: HOME | End: 2022-12-16
Payer: COMMERCIAL

## 2022-12-16 DIAGNOSIS — R10.84 GENERALIZED ABDOMINAL PAIN: ICD-10-CM

## 2022-12-16 DIAGNOSIS — R19.4 CHANGE IN BOWEL HABITS: ICD-10-CM

## 2022-12-16 DIAGNOSIS — R11.0 NAUSEA: ICD-10-CM

## 2022-12-20 ENCOUNTER — HOSPITAL ENCOUNTER (OUTPATIENT)
Dept: RADIOLOGY | Facility: HOSPITAL | Age: 37
Discharge: HOME | End: 2022-12-20
Attending: PHYSICIAN ASSISTANT
Payer: COMMERCIAL

## 2022-12-20 DIAGNOSIS — R11.0 NAUSEA: ICD-10-CM

## 2022-12-20 DIAGNOSIS — R10.84 GENERALIZED ABDOMINAL PAIN: ICD-10-CM

## 2022-12-20 DIAGNOSIS — R19.4 CHANGE IN BOWEL HABITS: ICD-10-CM

## 2022-12-20 PROCEDURE — 76705 ECHO EXAM OF ABDOMEN: CPT | Mod: TC

## 2023-01-11 ENCOUNTER — PATIENT MESSAGE (OUTPATIENT)
Dept: ENDOSCOPY | Facility: HOSPITAL | Age: 38
End: 2023-01-11
Payer: COMMERCIAL

## 2023-01-13 ENCOUNTER — ANESTHESIA EVENT (OUTPATIENT)
Dept: ENDOSCOPY | Facility: HOSPITAL | Age: 38
End: 2023-01-13
Payer: COMMERCIAL

## 2023-01-13 NOTE — ANESTHESIA PREPROCEDURE EVALUATION
01/13/2023  José Manuel Darling is a 37 y.o., male.    History reviewed. No pertinent past medical history.  Past Surgical History:   Procedure Laterality Date    FINGER SURGERY Right 04/22/2022       Pre-op Assessment    I have reviewed the Patient Summary Reports.     I have reviewed the Nursing Notes. I have reviewed the NPO Status.   I have reviewed the Medications.     Review of Systems  Anesthesia Hx:  No problems with previous Anesthesia  Denies Family Hx of Anesthesia complications.   Denies Personal Hx of Anesthesia complications.   Social:  Former Smoker, No Alcohol Use THC   Hematology/Oncology:  Hematology Normal   Oncology Normal     EENT/Dental:EENT/Dental Normal   Cardiovascular:  Cardiovascular Normal Exercise tolerance: good  Denies MI.  Denies CAD.     Denies Angina.    Renal/:  Renal/ Normal     Hepatic/GI:   Bowel Prep. abd pain, change in bowel habits    Musculoskeletal:  Musculoskeletal Normal    Neurological:  Neurology Normal    Endocrine:  Endocrine Normal    Dermatological:  Skin Normal    Psych:  Psychiatric Normal           Physical Exam  General: Well nourished, Cooperative and Alert    Airway:  Mallampati: II   Mouth Opening: Normal  TM Distance: Normal  Tongue: Normal  Neck ROM: Normal ROM    Dental:  Intact    Chest/Lungs:  Clear to auscultation, Normal Respiratory Rate    Heart:  Rate: Normal  Rhythm: Regular Rhythm        Anesthesia Plan  Type of Anesthesia, risks & benefits discussed:    Anesthesia Type: Gen Natural Airway  Intra-op Monitoring Plan: Standard ASA Monitors  Post Op Pain Control Plan: multimodal analgesia  Induction:  IV  Informed Consent: Informed consent signed with the Patient and all parties understand the risks and agree with anesthesia plan.  All questions answered. Patient consented to blood products? No  ASA Score: 2  Day of Surgery Review of History &  Physical: H&P Update referred to the surgeon/provider.    Ready For Surgery From Anesthesia Perspective.     .

## 2023-01-18 ENCOUNTER — ANESTHESIA (OUTPATIENT)
Dept: ENDOSCOPY | Facility: HOSPITAL | Age: 38
End: 2023-01-18
Payer: COMMERCIAL

## 2023-01-18 ENCOUNTER — HOSPITAL ENCOUNTER (OUTPATIENT)
Facility: HOSPITAL | Age: 38
Discharge: HOME | End: 2023-01-18
Attending: INTERNAL MEDICINE | Admitting: INTERNAL MEDICINE
Payer: COMMERCIAL

## 2023-01-18 VITALS
HEIGHT: 72 IN | SYSTOLIC BLOOD PRESSURE: 128 MMHG | TEMPERATURE: 98 F | BODY MASS INDEX: 27.71 KG/M2 | RESPIRATION RATE: 16 BRPM | WEIGHT: 204.56 LBS | DIASTOLIC BLOOD PRESSURE: 81 MMHG | OXYGEN SATURATION: 100 % | HEART RATE: 68 BPM

## 2023-01-18 PROCEDURE — 88305 TISSUE EXAM BY PATHOLOGIST: CPT | Performed by: STUDENT IN AN ORGANIZED HEALTH CARE EDUCATION/TRAINING PROGRAM

## 2023-01-18 PROCEDURE — 25000003 PHARM REV CODE 250: Performed by: INTERNAL MEDICINE

## 2023-01-18 PROCEDURE — 63600175 PHARM REV CODE 636 W HCPCS: Performed by: INTERNAL MEDICINE

## 2023-01-18 PROCEDURE — D9220A PRA ANESTHESIA: Mod: ANES,,, | Performed by: ANESTHESIOLOGY

## 2023-01-18 PROCEDURE — 88305 TISSUE EXAM BY PATHOLOGIST: CPT | Mod: 26,,, | Performed by: STUDENT IN AN ORGANIZED HEALTH CARE EDUCATION/TRAINING PROGRAM

## 2023-01-18 PROCEDURE — 37000009 HC ANESTHESIA EA ADD 15 MINS: Performed by: INTERNAL MEDICINE

## 2023-01-18 PROCEDURE — 37000008 HC ANESTHESIA 1ST 15 MINUTES: Performed by: INTERNAL MEDICINE

## 2023-01-18 PROCEDURE — 27201012 HC FORCEPS, HOT/COLD, DISP: Performed by: INTERNAL MEDICINE

## 2023-01-18 PROCEDURE — 43239 EGD BIOPSY SINGLE/MULTIPLE: CPT | Performed by: INTERNAL MEDICINE

## 2023-01-18 PROCEDURE — 43239 PR EGD, FLEX, W/BIOPSY, SGL/MULTI: ICD-10-PCS | Mod: 51,,, | Performed by: INTERNAL MEDICINE

## 2023-01-18 PROCEDURE — D9220A PRA ANESTHESIA: Mod: CRNA,,, | Performed by: NURSE ANESTHETIST, CERTIFIED REGISTERED

## 2023-01-18 PROCEDURE — 88342 CHG IMMUNOCYTOCHEMISTRY: ICD-10-PCS | Mod: 26,,, | Performed by: STUDENT IN AN ORGANIZED HEALTH CARE EDUCATION/TRAINING PROGRAM

## 2023-01-18 PROCEDURE — 88305 TISSUE EXAM BY PATHOLOGIST: ICD-10-PCS | Mod: 26,,, | Performed by: STUDENT IN AN ORGANIZED HEALTH CARE EDUCATION/TRAINING PROGRAM

## 2023-01-18 PROCEDURE — 63600175 PHARM REV CODE 636 W HCPCS: Performed by: NURSE ANESTHETIST, CERTIFIED REGISTERED

## 2023-01-18 PROCEDURE — 45378 PR COLONOSCOPY,DIAGNOSTIC: ICD-10-PCS | Mod: ,,, | Performed by: INTERNAL MEDICINE

## 2023-01-18 PROCEDURE — 88342 IMHCHEM/IMCYTCHM 1ST ANTB: CPT | Mod: 26,,, | Performed by: STUDENT IN AN ORGANIZED HEALTH CARE EDUCATION/TRAINING PROGRAM

## 2023-01-18 PROCEDURE — D9220A PRA ANESTHESIA: ICD-10-PCS | Mod: CRNA,,, | Performed by: NURSE ANESTHETIST, CERTIFIED REGISTERED

## 2023-01-18 PROCEDURE — 43239 EGD BIOPSY SINGLE/MULTIPLE: CPT | Mod: 51,,, | Performed by: INTERNAL MEDICINE

## 2023-01-18 PROCEDURE — D9220A PRA ANESTHESIA: ICD-10-PCS | Mod: ANES,,, | Performed by: ANESTHESIOLOGY

## 2023-01-18 PROCEDURE — 45378 DIAGNOSTIC COLONOSCOPY: CPT | Mod: ,,, | Performed by: INTERNAL MEDICINE

## 2023-01-18 PROCEDURE — 45378 DIAGNOSTIC COLONOSCOPY: CPT | Performed by: INTERNAL MEDICINE

## 2023-01-18 RX ORDER — FENTANYL CITRATE 50 UG/ML
INJECTION, SOLUTION INTRAMUSCULAR; INTRAVENOUS
Status: DISCONTINUED | OUTPATIENT
Start: 2023-01-18 | End: 2023-01-18

## 2023-01-18 RX ORDER — SODIUM CHLORIDE, SODIUM LACTATE, POTASSIUM CHLORIDE, CALCIUM CHLORIDE 600; 310; 30; 20 MG/100ML; MG/100ML; MG/100ML; MG/100ML
INJECTION, SOLUTION INTRAVENOUS CONTINUOUS
Status: DISCONTINUED | OUTPATIENT
Start: 2023-01-18 | End: 2023-01-18 | Stop reason: HOSPADM

## 2023-01-18 RX ORDER — PROPOFOL 10 MG/ML
VIAL (ML) INTRAVENOUS
Status: DISCONTINUED | OUTPATIENT
Start: 2023-01-18 | End: 2023-01-18

## 2023-01-18 RX ORDER — DEXTROMETHORPHAN/PSEUDOEPHED 2.5-7.5/.8
DROPS ORAL
Status: COMPLETED | OUTPATIENT
Start: 2023-01-18 | End: 2023-01-18

## 2023-01-18 RX ORDER — LIDOCAINE HCL/PF 100 MG/5ML
SYRINGE (ML) INTRAVENOUS
Status: DISCONTINUED | OUTPATIENT
Start: 2023-01-18 | End: 2023-01-18

## 2023-01-18 RX ADMIN — Medication 100 MG: at 09:01

## 2023-01-18 RX ADMIN — PROPOFOL 50 MG: 10 INJECTION, EMULSION INTRAVENOUS at 09:01

## 2023-01-18 RX ADMIN — PROPOFOL 100 MG: 10 INJECTION, EMULSION INTRAVENOUS at 09:01

## 2023-01-18 RX ADMIN — SODIUM CHLORIDE, SODIUM LACTATE, POTASSIUM CHLORIDE, AND CALCIUM CHLORIDE: 600; 310; 30; 20 INJECTION, SOLUTION INTRAVENOUS at 08:01

## 2023-01-18 RX ADMIN — FENTANYL CITRATE 50 MCG: 50 INJECTION, SOLUTION INTRAMUSCULAR; INTRAVENOUS at 09:01

## 2023-01-18 NOTE — PROVATION PATIENT INSTRUCTIONS
Discharge Summary/Instructions after an Endoscopic Procedure  Patient Name: José Manuel Darling  Patient MRN: [id number]  Patient YOB: 1985 Wednesday, January 18, 2023  Francesca Barr MD  Dear patient,  As a result of recent federal legislation (The Federal Cures Act), you may   receive lab or pathology results from your procedure in your [facility]   account before your physician is able to contact you. Your physician or   their representative will relay the results to you with their   recommendations at their soonest availability.  Thank you,  RESTRICTIONS:  During your procedure today, you received medications for sedation.  These   medications may affect your judgment, balance and coordination.  Therefore,   for 24 hours, you have the following restrictions:   - DO NOT drive a car, operate machinery, make legal/financial decisions,   sign important papers or drink alcohol.    ACTIVITY:  Today: no heavy lifting, straining or running due to procedural   sedation/anesthesia.  The following day: return to full activity including work.  DIET:  Eat and drink normally unless instructed otherwise.     TREATMENT FOR COMMON SIDE EFFECTS:  - Mild abdominal pain, nausea, belching, bloating or excessive gas:  rest,   eat lightly and use a heating pad.  - Sore Throat: treat with throat lozenges and/or gargle with warm salt   water.  - Because air was used during the procedure, expelling large amounts of air   from your rectum or belching is normal.  - If a bowel prep was taken, you may not have a bowel movement for 1-3 days.    This is normal.  SYMPTOMS TO WATCH FOR AND REPORT TO YOUR PHYSICIAN:  1. Abdominal pain or bloating, other than gas cramps.  2. Chest pain.  3. Back pain.  4. Signs of infection such as: chills or fever occurring within 24 hours   after the procedure.  5. Rectal bleeding, which would show as bright red, maroon, or black stools.   (A tablespoon of blood from the rectum is not serious,  especially if   hemorrhoids are present.)  6. Vomiting.  7. Weakness or dizziness.  GO DIRECTLY TO THE NEAREST EMERGENCY ROOM IF YOU HAVE ANY OF THE FOLLOWING:      Difficulty breathing              Chills and/or fever over 101 F   Persistent vomiting and/or vomiting blood   Severe abdominal pain   Severe chest pain   Black, tarry stools   Bleeding- more than one tablespoon   Any other symptom or condition that you feel may need urgent attention  Your doctor recommends these additional instructions:  If any biopsies were taken, your doctors clinic will contact you in 1 to 2   weeks with any results.  - Discharge patient to home.   - Resume previous diet.   - Continue present medications.   - Return to GI clinic PRN.   - Repeat colonoscopy at age 45 or as clinically indicated for screening   purposes.  For questions, problems or results please call your physician Francesca Barr MD at Work:  [phone]  If you have any questions about the above instructions, call the GI   department at [phone] or call the endoscopy unit at [phone]   from 7am until 3 pm.  [facility] [place], EMERGENCY ROOM PHONE NUMBER:   [phone]  IF A COMPLICATION OR EMERGENCY SITUATION ARISES AND YOU ARE UNABLE TO REACH   YOUR PHYSICIAN - GO DIRECTLY TO THE EMERGENCY ROOM.  I have read or have had read to me these discharge instructions for my   procedure and have received a written copy.  I understand these   instructions and will follow-up with my physician if I have any questions.     __________________________________       _____________________________________  Nurse Signature                                          Patient/Designated   Responsible Party Signature  MD Francesca Rich MD  1/18/2023 9:57:45 AM  This report has been verified and signed electronically.  Dear patient,  As a result of recent federal legislation (The Federal Cures Act), you may    receive lab or pathology results from your procedure in your [facility]   account before your physician is able to contact you. Your physician or   their representative will relay the results to you with their   recommendations at their soonest availability.  Thank you,  PROVATION

## 2023-01-18 NOTE — ANESTHESIA POSTPROCEDURE EVALUATION
Anesthesia Post Evaluation    Patient: José Manuel Darling    Procedure(s) Performed: Procedure(s) (LRB):  EGD (ESOPHAGOGASTRODUODENOSCOPY) (N/A)  COLONOSCOPY (N/A)    Final Anesthesia Type: MAC      Patient location during evaluation: PACU  Patient participation: Yes- Able to Participate  Level of consciousness: awake and alert and oriented  Post-procedure vital signs: reviewed and stable  Pain management: adequate  Airway patency: patent    PONV status at discharge: No PONV  Anesthetic complications: no      Cardiovascular status: hemodynamically stable  Respiratory status: unassisted  Hydration status: euvolemic  Follow-up not needed.          Vitals Value Taken Time   /81 01/18/23 0954   Temp 36.8 °C (98.2 °F) 01/18/23 0954   Pulse 68 01/18/23 0954   Resp 16 01/18/23 0954   SpO2 100 % 01/18/23 0954         Event Time   Out of Recovery 09:56:55         Pain/Lizzy Score: Lizzy Score: 10 (1/18/2023  9:54 AM)

## 2023-01-18 NOTE — PLAN OF CARE
Pt. Ready for discharge. We are just waiting on his ride to come back to the facility to  his friend, the patient. The ride left with patient's clothes and is according to the patient about 10 minutes away

## 2023-01-18 NOTE — PROVATION PATIENT INSTRUCTIONS
Discharge Summary/Instructions after an Endoscopic Procedure  Patient Name: José Manuel Darling  Patient MRN: [id number]  Patient YOB: 1985 Wednesday, January 18, 2023  Francesca Barr MD  Dear patient,  As a result of recent federal legislation (The Federal Cures Act), you may   receive lab or pathology results from your procedure in your [facility]   account before your physician is able to contact you. Your physician or   their representative will relay the results to you with their   recommendations at their soonest availability.  Thank you,  RESTRICTIONS:  During your procedure today, you received medications for sedation.  These   medications may affect your judgment, balance and coordination.  Therefore,   for 24 hours, you have the following restrictions:   - DO NOT drive a car, operate machinery, make legal/financial decisions,   sign important papers or drink alcohol.    ACTIVITY:  Today: no heavy lifting, straining or running due to procedural   sedation/anesthesia.  The following day: return to full activity including work.  DIET:  Eat and drink normally unless instructed otherwise.     TREATMENT FOR COMMON SIDE EFFECTS:  - Mild abdominal pain, nausea, belching, bloating or excessive gas:  rest,   eat lightly and use a heating pad.  - Sore Throat: treat with throat lozenges and/or gargle with warm salt   water.  - Because air was used during the procedure, expelling large amounts of air   from your rectum or belching is normal.  - If a bowel prep was taken, you may not have a bowel movement for 1-3 days.    This is normal.  SYMPTOMS TO WATCH FOR AND REPORT TO YOUR PHYSICIAN:  1. Abdominal pain or bloating, other than gas cramps.  2. Chest pain.  3. Back pain.  4. Signs of infection such as: chills or fever occurring within 24 hours   after the procedure.  5. Rectal bleeding, which would show as bright red, maroon, or black stools.   (A tablespoon of blood from the rectum is not serious,  especially if   hemorrhoids are present.)  6. Vomiting.  7. Weakness or dizziness.  GO DIRECTLY TO THE NEAREST EMERGENCY ROOM IF YOU HAVE ANY OF THE FOLLOWING:      Difficulty breathing              Chills and/or fever over 101 F   Persistent vomiting and/or vomiting blood   Severe abdominal pain   Severe chest pain   Black, tarry stools   Bleeding- more than one tablespoon   Any other symptom or condition that you feel may need urgent attention  Your doctor recommends these additional instructions:  If any biopsies were taken, your doctors clinic will contact you in 1 to 2   weeks with any results.  - Discharge patient to home.   - Resume previous diet.   - Continue present medications.   - Await pathology results.   - Use Protonix (pantoprazole) 40 mg PO daily for 2 months.  For questions, problems or results please call your physician Francesca Barr MD at Work:  [phone]  If you have any questions about the above instructions, call the GI   department at [phone] or call the endoscopy unit at [phone]   from 7am until 3 pm.  [facility] [place], EMERGENCY ROOM PHONE NUMBER:   [phone]  IF A COMPLICATION OR EMERGENCY SITUATION ARISES AND YOU ARE UNABLE TO REACH   YOUR PHYSICIAN - GO DIRECTLY TO THE EMERGENCY ROOM.  I have read or have had read to me these discharge instructions for my   procedure and have received a written copy.  I understand these   instructions and will follow-up with my physician if I have any questions.     __________________________________       _____________________________________  Nurse Signature                                          Patient/Designated   Responsible Party Signature  MD Francesca Rich MD  1/18/2023 9:20:42 AM  This report has been verified and signed electronically.  Dear patient,  As a result of recent federal legislation (The Federal Cures Act), you may   receive lab or pathology  results from your procedure in your [facility]   account before your physician is able to contact you. Your physician or   their representative will relay the results to you with their   recommendations at their soonest availability.  Thank you,  PROVATION

## 2023-01-18 NOTE — TRANSFER OF CARE
Anesthesia Transfer of Care Note    Patient: José Manuel Darling    Procedure(s) Performed: Procedure(s) (LRB):  EGD (ESOPHAGOGASTRODUODENOSCOPY) (N/A)  COLONOSCOPY (N/A)    Patient location: PACU    Anesthesia Type: general    Transport from OR: Transported from OR on room air with adequate spontaneous ventilation    Post pain: adequate analgesia    Post assessment: no apparent anesthetic complications    Post vital signs: stable    Level of consciousness: sedated    Nausea/Vomiting: no nausea/vomiting    Complications: none    Transfer of care protocol was followed      Last vitals:   Visit Vitals  /77 (BP Location: Right arm, Patient Position: Sitting)   Pulse 87   Temp 36.7 °C (98 °F) (Temporal)   Resp 18   Ht 6' (1.829 m)   Wt 92.8 kg (204 lb 9.4 oz)   SpO2 98%   BMI 27.75 kg/m²

## 2023-01-18 NOTE — H&P
PRE PROCEDURE H&P    Patient Name: José Manuel Darling  MRN: [id number]  : 1985  Date of Procedure:  2023  Referring Physician: Teresa Gupta PA-C  Primary Physician: Alfie Castillo MD  Procedure Physician: Francesca Barr MD       Planned Procedure: Colonoscopy and EGD  Diagnosis: abdominal pain and altered bowel movements since he broke a digit on his right hand. Was on antibiotics and norco. Previously was taking NSAIDs. No family history of IBD, celiac.    Currently not taking anything for pain as he is pain free after surgical repair of his digit.    Chief Complaint: Same as above    HPI: Patient is an 37 y.o. male is here for the above.     Risks and benefits of the procedure were discussed including the risks of bleeding, perforation, requiring surgery, risks related to anesthesia. The patient expressed understanding and agreed to proceed. No language barriers were present.       Past Medical History:   History reviewed. No pertinent past medical history.     Past Surgical History:  Past Surgical History:   Procedure Laterality Date    FINGER SURGERY Right 2022        Home Medications:  Prior to Admission medications    Medication Sig Start Date End Date Taking? Authorizing Provider   LIDOcaine (LIDODERM) 5 % Use 1 patch per day over chest or thoracic spine.  Do not keep patch on affected area for more than 12 hours.  Please wait 12 hours before applying new patch  Patient not taking: Reported on 12/15/2022 12/13/22   Gonsalo Timmons MD   pantoprazole (PROTONIX) 20 MG tablet Take 1 tablet (20 mg total) by mouth once daily. 22  Alfie Castillo MD   celecoxib (CELEBREX) 200 MG capsule Take 1 capsule (200 mg total) by mouth 2 (two) times daily as needed for Pain.  Patient not taking: Reported on 12/15/2022 12/13/22 1/18/23  Gonsalo Timmons MD   ondansetron (ZOFRAN-ODT) 4 MG TbDL Take 1 tablet (4 mg total) by mouth every 6 (six) hours as needed (nausea). 22  23  Alfie Castillo MD   promethazine (PHENERGAN) 25 MG suppository Place 1 suppository (25 mg total) rectally every 6 (six) hours as needed for Nausea.  Patient not taking: Reported on 12/15/2022 12/9/22 1/18/23  Alfie Castillo MD        Allergies:  Review of patient's allergies indicates:  No Known Allergies     Social History:   Social History     Socioeconomic History    Marital status: Single   Tobacco Use    Smoking status: Former     Types: Vaping with nicotine, Cigarettes     Quit date: 2022     Years since quittin.1    Smokeless tobacco: Never    Tobacco comments:     Vaping now, quit cigarettes   Substance and Sexual Activity    Alcohol use: Not Currently    Drug use: Yes     Types: Marijuana    Sexual activity: Yes     Partners: Male       Family History:  Family History   Problem Relation Age of Onset    Lung cancer Mother     Heart disease Father     Diabetes Father     Heart disease Maternal Grandmother     Stroke Maternal Grandmother     Stroke Paternal Grandmother     Heart disease Paternal Grandmother     Diabetes Paternal Grandfather        ROS: No acute cardiac events, no acute respiratory complaints.     Physical Exam (all patients):    /77 (BP Location: Right arm, Patient Position: Sitting)   Pulse 87   Temp 98 °F (36.7 °C) (Temporal)   Resp 18   Ht 6' (1.829 m)   Wt 92.8 kg (204 lb 9.4 oz)   SpO2 98%   BMI 27.75 kg/m²   Lungs: Clear to auscultation bilaterally, respirations unlabored  Heart: Regular rate and rhythm, S1 and S2 normal, no obvious murmurs  Abdomen:         Soft, non-tender, bowel sounds normal, no masses, no organomegaly    Lab Results   Component Value Date    WBC 4.33 2022    MCV 97 2022    RDW 12.6 2022     2022    GLU 88 2022    HGBA1C 5.3 2022    BUN 11 2022     2022    K 4.7 2022     2022        SEDATION PLAN: per anesthesia      History reviewed, vital signs  satisfactory, cardiopulmonary status satisfactory, sedation options, risks and plans have been discussed with the patient  All their questions were answered and the patient agrees to the sedation procedures as planned and the patient is deemed an appropriate candidate for the sedation as planned.    Procedure explained to patient, informed consent obtained and placed in chart.    Francesca Barr  1/18/2023  8:50 AM

## 2023-01-24 LAB
FINAL PATHOLOGIC DIAGNOSIS: NORMAL
Lab: NORMAL

## 2023-01-31 ENCOUNTER — PATIENT MESSAGE (OUTPATIENT)
Dept: GASTROENTEROLOGY | Facility: CLINIC | Age: 38
End: 2023-01-31
Payer: COMMERCIAL

## 2023-02-13 ENCOUNTER — PATIENT MESSAGE (OUTPATIENT)
Dept: GASTROENTEROLOGY | Facility: CLINIC | Age: 38
End: 2023-02-13
Payer: COMMERCIAL

## 2023-02-27 RX ORDER — PROMETHAZINE HYDROCHLORIDE 25 MG/1
25 SUPPOSITORY RECTAL EVERY 6 HOURS PRN
Qty: 30 SUPPOSITORY | Refills: 6 | Status: SHIPPED | OUTPATIENT
Start: 2023-02-27

## 2023-02-27 NOTE — TELEPHONE ENCOUNTER
----- Message from Luis Paz sent at 2/27/2023  8:03 AM CST -----  Contact: Self - [phone]  .Type:  RX Refill Request    Who Called: José Manuel  Refill or New Rx:Refill  RX Name and Strength:Phenergan Suppository   How is the patient currently taking it? (ex. 1XDay):unknown   Is this a 30 day or 90 day RX:unknown   Preferred Pharmacy with phone number:  [id number] - [facility] [address] [facility] [address]  [address]  [facility] [address]  Phone: [phone] Fax: [phone]  Local or Mail Order:Local  Ordering Provider:Dr. Castillo  Would the patient rather a call back or a response via [facility]? Call Back  Best Call Back Number:[phone]  Additional Information: pt called In stating he was prescribed this by Dr. Castillo and his pharmacy is telling him its no longer active. Please advise